# Patient Record
Sex: MALE | Race: OTHER | HISPANIC OR LATINO | URBAN - METROPOLITAN AREA
[De-identification: names, ages, dates, MRNs, and addresses within clinical notes are randomized per-mention and may not be internally consistent; named-entity substitution may affect disease eponyms.]

---

## 2021-08-23 ENCOUNTER — EMERGENCY (EMERGENCY)
Facility: HOSPITAL | Age: 31
LOS: 0 days | Discharge: HOME | End: 2021-08-23
Attending: STUDENT IN AN ORGANIZED HEALTH CARE EDUCATION/TRAINING PROGRAM | Admitting: STUDENT IN AN ORGANIZED HEALTH CARE EDUCATION/TRAINING PROGRAM
Payer: COMMERCIAL

## 2021-08-23 VITALS
DIASTOLIC BLOOD PRESSURE: 78 MMHG | OXYGEN SATURATION: 99 % | HEART RATE: 70 BPM | SYSTOLIC BLOOD PRESSURE: 116 MMHG | WEIGHT: 199.96 LBS | RESPIRATION RATE: 18 BRPM | TEMPERATURE: 98 F

## 2021-08-23 DIAGNOSIS — M79.641 PAIN IN RIGHT HAND: ICD-10-CM
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
## 2021-08-23 PROCEDURE — 73110 X-RAY EXAM OF WRIST: CPT | Mod: 26,RT

## 2021-08-23 PROCEDURE — 99283 EMERGENCY DEPT VISIT LOW MDM: CPT

## 2021-08-23 PROCEDURE — 73130 X-RAY EXAM OF HAND: CPT | Mod: 26,RT

## 2021-08-23 RX ORDER — ACETAMINOPHEN 500 MG
650 TABLET ORAL ONCE
Refills: 0 | Status: COMPLETED | OUTPATIENT
Start: 2021-08-23 | End: 2021-08-23

## 2021-08-23 RX ADMIN — Medication 650 MILLIGRAM(S): at 17:28
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
## 2021-08-23 NOTE — ED PROVIDER NOTE - NS ED ROS FT
GEN:  no fever, no chills, no generalized weakness  MSK:  +right hand pain  SKIN:  no rash, no bruising

## 2021-08-23 NOTE — ED PROVIDER NOTE - CARE PROVIDER_API CALL
Pranav Purvis)  Orthopaedic Surgery  [address] [place] [address]  Phone: [phone]  Fax: [phone]  Follow Up Time: 1-3 Days

## 2021-08-23 NOTE — ED PROVIDER NOTE - PHYSICAL EXAMINATION
CONSTITUTIONAL: Well-developed; well-nourished; in no acute distress.   SKIN: warm, dry  HEAD: Normocephalic; atraumatic.  EYES: no conjunctival injection. PERRLA. EOMI.   ENT: No nasal discharge; airway clear.  NECK: Supple; non tender.  EXT: +right dorsal hand tenderness largely proximal to 2nd & 3rd MCP with associated swelling. Normal ROM.  No clubbing, cyanosis.  NEURO: Alert, oriented, grossly unremarkable.  PSYCH: Cooperative, appropriate.

## 2021-08-23 NOTE — ED PROVIDER NOTE - PATIENT PORTAL LINK FT
You can access the FollowMyHealth Patient Portal offered by [facility] by registering at the following website: [email or web address]. By joining [facility]’s FollowMyHealth portal, you will also be able to view your health information using other applications (apps) compatible with our system.

## 2021-08-23 NOTE — ED PROVIDER NOTE - NSFOLLOWUPINSTRUCTIONS_ED_ALL_ED_FT
Tratamiento R.I.C.E. El tratamiento    LO QUE NECESITA SABER:    ¿Qué es el tratamiento R.I.C.E.?El tratamiento R.I.C.E es un proceso de 4 pasos que se usa para reducir la inflamación y el dolor a causa de ludwin lesión. R.I.C.E. quiere decir, por elsa siglas en inglés, reposo, hielo, compresión y elevación. El tratamiento R.I.C.E. se debe realizar dentro de las primeras 24 a 48 horas después de ludwin lesión.    Hielo y elevación       Hielo y elevación         ¿Cómo uso el tratamiento R.I.C.E.?  •Descanseel área lesionada denise se le indique. Es posible que deba dejar de usar peso o de ejercer peso en la lesión baljinder 48 horas o más. Padilla médico puede recomendarle que use muletas u otro dispositivo. Regrese a elsa actividades usuales denise se le indique.      •Aplique hieloen el área lesionada de 15 a 20 minutos cada 4 horas o denise se le indique. Use ludwin compresa de hielo o ponga hielo triturado en ludwin bolsa de plástico. Cúbrala con ludwin toalla. El hielo ayuda a evitar daño al tejido y a disminuir la inflamación y el dolor.      •Compresióno mantener la presión sobre el área lesionada. La compresión le ayudará a desinflamar el área lesionada y a apoyarla. Use un vendaje elástico, un estribo de aire, ludwin férula o un cabestrillo denise se lo hayan indicado. Si utiliza un vendaje elástico para envolver el área lesionada, asegúrese de que el vendaje no esté demasiado ajustado.      •Eleveel área lesionada por encima del nivel del corazón tan seguido denise pueda. [place] va a disminuir inflamación y el dolor. Coloque el área lesionada sobre almohadas o cobijas para mantenerla elevada cómodamente.      ¿Cuándo patti buscar atención inmediata?  •Siente mucho dolor.      •Si tiene ludwin inflamación grave o ludwin deformidad.      •Usted tiene entumecida la mynor lesionada.      ¿Cuándo patti comunicarme con mi médico?  •El dolor y la inflamación no desaparecen después de algunos días.      •Usted tiene preguntas o inquietudes acerca de padilla condición o cuidado.      ACUERDOS SOBRE PADILLA CUIDADO:    Usted tiene el derecho de ayudar a planear padilla cuidado. Aprenda todo lo que pueda sobre padilla condición y denise darle tratamiento. Discuta elsa opciones de tratamiento con elsa médicos para decidir el cuidado que usted desea recibir. Usted siempre tiene el derecho de rechazar el tratamiento.
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
